# Patient Record
Sex: FEMALE | Race: WHITE | NOT HISPANIC OR LATINO | Employment: FULL TIME | URBAN - METROPOLITAN AREA
[De-identification: names, ages, dates, MRNs, and addresses within clinical notes are randomized per-mention and may not be internally consistent; named-entity substitution may affect disease eponyms.]

---

## 2017-10-04 ENCOUNTER — TELEPHONE (OUTPATIENT)
Dept: ONCOLOGY | Facility: HOSPITAL | Age: 62
End: 2017-10-04

## 2017-10-04 ENCOUNTER — DOCUMENTATION (OUTPATIENT)
Dept: ONCOLOGY | Facility: CLINIC | Age: 62
End: 2017-10-04

## 2017-10-04 NOTE — TELEPHONE ENCOUNTER
Patient calling to see if she needs f/u appt. According to last MD appt patient does need f/u appt with us and needs new referral. Patient states her PCP had already sent one over. Message sent to scheduling to set up appt for patient and note sent to Dr Alonso to see if patient needs scan beforehand.

## 2017-10-04 NOTE — PROGRESS NOTES
Per message from JODI Arriaza RN patient called stating she needs to follow up with Dr Alonso. Patient last seen at [facility] office 5/19/15. Patient stated PCP has sent a referral. Ghada @ appointment desk requesting a order be placed for Dexa scan. There is no patient insurance information in EPIC Dexa scan cannot be ordered until patient is seen by Dr Alonso. Spoke with Giovanna Nurse Manager and she verified patient will need to follow up with Dr Alonso before Dexa scan can be ordered.

## 2017-10-23 ENCOUNTER — APPOINTMENT (OUTPATIENT)
Dept: WOMENS IMAGING | Facility: HOSPITAL | Age: 62
End: 2017-10-23

## 2017-10-23 PROCEDURE — 76642 ULTRASOUND BREAST LIMITED: CPT | Performed by: RADIOLOGY

## 2017-10-23 PROCEDURE — G0202 SCR MAMMO BI INCL CAD: HCPCS | Performed by: RADIOLOGY

## 2017-10-23 PROCEDURE — MDREVIEWSP: Performed by: RADIOLOGY

## 2017-10-23 PROCEDURE — 77063 BREAST TOMOSYNTHESIS BI: CPT | Performed by: RADIOLOGY

## 2017-10-23 PROCEDURE — 77067 SCR MAMMO BI INCL CAD: CPT | Performed by: RADIOLOGY

## 2017-11-14 DIAGNOSIS — C50.919 MALIGNANT NEOPLASM OF BREAST IN FEMALE, ESTROGEN RECEPTOR POSITIVE, UNSPECIFIED LATERALITY, UNSPECIFIED SITE OF BREAST (HCC): Primary | ICD-10-CM

## 2017-11-14 DIAGNOSIS — Z17.0 MALIGNANT NEOPLASM OF BREAST IN FEMALE, ESTROGEN RECEPTOR POSITIVE, UNSPECIFIED LATERALITY, UNSPECIFIED SITE OF BREAST (HCC): Primary | ICD-10-CM

## 2017-11-16 ENCOUNTER — LAB (OUTPATIENT)
Dept: LAB | Facility: HOSPITAL | Age: 62
End: 2017-11-16

## 2017-11-16 ENCOUNTER — OFFICE VISIT (OUTPATIENT)
Dept: ONCOLOGY | Facility: CLINIC | Age: 62
End: 2017-11-16

## 2017-11-16 ENCOUNTER — INFUSION (OUTPATIENT)
Dept: ONCOLOGY | Facility: HOSPITAL | Age: 62
End: 2017-11-16

## 2017-11-16 VITALS
TEMPERATURE: 98.5 F | SYSTOLIC BLOOD PRESSURE: 118 MMHG | OXYGEN SATURATION: 96 % | RESPIRATION RATE: 16 BRPM | BODY MASS INDEX: 39.92 KG/M2 | WEIGHT: 239.6 LBS | DIASTOLIC BLOOD PRESSURE: 84 MMHG | HEART RATE: 78 BPM | HEIGHT: 65 IN

## 2017-11-16 DIAGNOSIS — Z17.0 MALIGNANT NEOPLASM OF BREAST IN FEMALE, ESTROGEN RECEPTOR POSITIVE, UNSPECIFIED LATERALITY, UNSPECIFIED SITE OF BREAST (HCC): ICD-10-CM

## 2017-11-16 DIAGNOSIS — Z17.0 MALIGNANT NEOPLASM OF LEFT BREAST IN FEMALE, ESTROGEN RECEPTOR POSITIVE, UNSPECIFIED SITE OF BREAST (HCC): Primary | ICD-10-CM

## 2017-11-16 DIAGNOSIS — C50.919 MALIGNANT NEOPLASM OF BREAST IN FEMALE, ESTROGEN RECEPTOR POSITIVE, UNSPECIFIED LATERALITY, UNSPECIFIED SITE OF BREAST (HCC): ICD-10-CM

## 2017-11-16 DIAGNOSIS — C50.912 MALIGNANT NEOPLASM OF LEFT BREAST IN FEMALE, ESTROGEN RECEPTOR POSITIVE, UNSPECIFIED SITE OF BREAST (HCC): Primary | ICD-10-CM

## 2017-11-16 LAB
BASOPHILS # BLD AUTO: 0.09 10*3/MM3 (ref 0–0.1)
BASOPHILS NFR BLD AUTO: 1.2 % (ref 0–1.1)
DEPRECATED RDW RBC AUTO: 41 FL (ref 37–49)
EOSINOPHIL # BLD AUTO: 0.6 10*3/MM3 (ref 0–0.36)
EOSINOPHIL NFR BLD AUTO: 8.1 % (ref 1–5)
ERYTHROCYTE [DISTWIDTH] IN BLOOD BY AUTOMATED COUNT: 13.2 % (ref 11.7–14.5)
HCT VFR BLD AUTO: 43.4 % (ref 34–45)
HGB BLD-MCNC: 14.2 G/DL (ref 11.5–14.9)
IMM GRANULOCYTES # BLD: 0.02 10*3/MM3 (ref 0–0.03)
IMM GRANULOCYTES NFR BLD: 0.3 % (ref 0–0.5)
LYMPHOCYTES # BLD AUTO: 2.37 10*3/MM3 (ref 1–3.5)
LYMPHOCYTES NFR BLD AUTO: 31.9 % (ref 20–49)
MCH RBC QN AUTO: 28 PG (ref 27–33)
MCHC RBC AUTO-ENTMCNC: 32.7 G/DL (ref 32–35)
MCV RBC AUTO: 85.4 FL (ref 83–97)
MONOCYTES # BLD AUTO: 0.55 10*3/MM3 (ref 0.25–0.8)
MONOCYTES NFR BLD AUTO: 7.4 % (ref 4–12)
NEUTROPHILS # BLD AUTO: 3.8 10*3/MM3 (ref 1.5–7)
NEUTROPHILS NFR BLD AUTO: 51.1 % (ref 39–75)
NRBC BLD MANUAL-RTO: 0 /100 WBC (ref 0–0)
PLATELET # BLD AUTO: 270 10*3/MM3 (ref 150–375)
PMV BLD AUTO: 10.3 FL (ref 8.9–12.1)
RBC # BLD AUTO: 5.08 10*6/MM3 (ref 3.9–5)
WBC NRBC COR # BLD: 7.43 10*3/MM3 (ref 4–10)

## 2017-11-16 PROCEDURE — 36415 COLL VENOUS BLD VENIPUNCTURE: CPT | Performed by: INTERNAL MEDICINE

## 2017-11-16 PROCEDURE — 99214 OFFICE O/P EST MOD 30 MIN: CPT | Performed by: INTERNAL MEDICINE

## 2017-11-16 PROCEDURE — 85025 COMPLETE CBC W/AUTO DIFF WBC: CPT | Performed by: INTERNAL MEDICINE

## 2017-11-16 RX ORDER — PRAVASTATIN SODIUM 40 MG
40 TABLET ORAL NIGHTLY
COMMUNITY
Start: 2017-09-13

## 2017-11-16 RX ORDER — ASPIRIN 81 MG/1
81 TABLET ORAL 2 TIMES DAILY
COMMUNITY

## 2017-11-16 RX ORDER — LOSARTAN POTASSIUM 50 MG/1
TABLET ORAL
COMMUNITY
Start: 2017-04-09

## 2017-11-16 RX ORDER — ASCORBIC ACID 500 MG
500 TABLET ORAL
COMMUNITY

## 2017-11-16 RX ORDER — VITAMIN B COMPLEX
1 CAPSULE ORAL DAILY
COMMUNITY

## 2017-11-16 RX ORDER — VENLAFAXINE HYDROCHLORIDE 75 MG/1
CAPSULE, EXTENDED RELEASE ORAL
COMMUNITY
Start: 2017-10-22

## 2017-11-16 NOTE — PROGRESS NOTES
Subjective   REASON FOR FOLLOWUP:       1.    T1cN0 low OncoType 16 , ER/MD breast cancer on Arimidex  2010. -    2. Worsening osteopenia, Boniva added in 2012.     3. Left chest wall costochondral pain, ? etiology, in 2012.     4. New lung nodule on CT at [facility] .     5. Lung nodule appeared to be resolved in 2013, but is stable in May 2014.         Worsening osteopenia in 2014 despite oral bisphosphonate Prolia added  in 5/15        History of Present Illness  patient is a 62-year-old female with a node-negative breast cancer low Oncotype score and completed 5 years of therapy with Arimidex about 2 years ago.  She was inadvertently lost to follow-up and returns for follow-up today with no new complaints.  She was supposed to continue on Prolia for her bone density but did not do this and at this time first to repeat a bone density to see how she is doing.  We discussed data about extended adjuvant treatment and at this point I think it may be reasonable to do a breast cancer index to see her chance if she has a high risk breast cancer in which case we would resume therapy with tamoxifen because of bone density issues and she is agreeable.  He is up-to-date with colonoscopies and is working to keep her weight down and having a hard time as  most postmenopausal women do    Active Ambulatory Problems     Diagnosis Date Noted   • Breast cancer 2009     Resolved Ambulatory Problems     Diagnosis Date Noted   • No Resolved Ambulatory Problems     Past Medical History:   Diagnosis Date   • Breast cancer    • Depression    • Diabetes mellitus    • Fatty liver    • Hypertension    • Osteopenia      Past Surgical History:   Procedure Laterality Date   • BREAST SURGERY  , ,     Breast tumor removed   •  SECTION     • HYSTERECTOMY  2010   • MASTECTOMY     • NOSE SURGERY         ONCOLOGICAL HISTORY:   The patient was found, on routine  mammography, to have an abnormality of the left breast, which is suspicious for DCIS.  Biopsy confirmed DCIS.  MRI showed a large amount of residual abnormality in the same breast and the patient op  t  ed for a mastectomy.  At the time of the mastectomy, surprisingly 75% of the 2.3 cm mass of DCIS was found to be invasive cancer (not microinvasion) and this is confirmed by special stains.  ER/NH was positive, HER-2 negative.  Oncotype DX was sent, but t  he pathologist at [facility] did not recognize the invasive cancer and said there was not enough invasive cancer to perform the specimen.  Dr. Ang Martinez has called them and redirected them to repeat the Oncotype DX.         Repeat Oncotype DX score showed recurrence score of 16 which is low with a 10% risk of recurrence overall and the patient was offered hormonal therapy with Arimidex after discussing the side effect profile.        The patient had the sudden onset of left chest wall discomfort in September 2012.    CAT scan of the chest was done which showed shotty nodes adjacent to the left subpectoral implant along the superior margin, and a small left axillary node.  No other pathological abnormalities and the lung was otherwise unremarkable.  The sternum was nor  mal and there were no rib fractures or lesions noted.  Fatty liver was also noted.          Patient seen at [facility] in 2/13 with persistent cough.  Chest x-ray showed an abnormality which led to a CT that reportedly showed a pulmonary nodule.  We will try and ge  t these films but followup scan in three months has been ordered to make sure the nodule resolves.  We discussed switching to tamoxifen because of hair loss issues but because of the Wellbutrin and Cymbalta that she has been on chronically, we opted to no  t do that at this time.         CT dated 05/28/13 shows stable small right mid field opacity that has been present since 09/12/12.  No change in shotty mediastinal axillary lymph  "nodes.  Followup in another year planned.            OB/GYN HISTORY: She is  1, para 1.  Her first childbirth was at age 38.  She took birth control pills for many years prior to her delivery, which was a . She did not breast feed.  Menarche was at age 12 and menopause at age 47.  She has ha  d no hormonal therapy since menopause.                 MEDICATIONS: The current medication list was reviewed with the patient and updates in the EMR this date per the [facility] assistant.  Medication dosages and frequencies were confirmed to be accurate        ALLERGIES:  NAPROXEN.         SOCIAL HISTORY:  .  Does not smoke or drink.  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         FAMILY HISTORY:   Mother had breast cancer   in her late 50s, had a lumpectomy and radiation and had recurrence in the same breast requiring a mastectomy, but  of unrelated causes.  She has no other history of breast or ovarian cancer in the family. Her paternal grandmother had colon cancer in   her 70s.          Review of Systems   A comprehensive 14 point review of systems was performed and was negative except as mentioned.    Medications:  The current medication list was reviewed in the EMR    ALLERGIES:    Allergies   Allergen Reactions   • Naproxen        Objective      Vitals:    17 1027   BP: 118/84   Pulse: 78   Resp: 16   Temp: 98.5 °F (36.9 °C)   TempSrc: Oral   SpO2: 96%   Weight: 239 lb 9.6 oz (109 kg)   Height: 64.72\" (164.4 cm)  Comment: new ht w/shoes   PainSc: 0-No pain     Current Status 2017   ECOG score 0       Physical Exam    GENERAL:  Well-developed, well-nourished in no acute distress.   SKIN:  Warm, dry without rashes, purpura or petechiae.  EYES:  Pupils equal, round and reactive to light.  EOMs intact.  Conjunctivae normal.  EARS:  Hearing intact.  NOSE:  Septum midline.  No excoriations or nasal discharge.  MOUTH:  Tongue is well-papillated; no stomatitis or ulcers.  Lips " normal.  THROAT:  Oropharynx without lesions or exudates.  NECK:  Supple with good range of motion; no thyromegaly or masses, no JVD.  LYMPHATICS:  No cervical, supraclavicular, axillary or inguinal adenopathy.  CHEST:  Lungs clear to auscultation. Good airflow.  BREASTS: Left breast has an implant in place) shows fibrocystic nodularity but no dominant masses  CARDIAC:  Regular rate and rhythm without murmurs, rubs or gallops. Normal S1,S2.  ABDOMEN:  Soft, nontender with no hepatosplenomegaly or masses.  EXTREMITIES:  No clubbing, cyanosis or edema.  NEUROLOGICAL:  Cranial Nerves II-XII grossly intact.  No focal neurological deficits.  PSYCHIATRIC:  Normal affect and mood.        RECENT LABS:  Hematology WBC   Date Value Ref Range Status   11/16/2017 7.43 4.00 - 10.00 10*3/mm3 Final     RBC   Date Value Ref Range Status   11/16/2017 5.08 (H) 3.90 - 5.00 10*6/mm3 Final     Hemoglobin   Date Value Ref Range Status   11/16/2017 14.2 11.5 - 14.9 g/dL Final     Hematocrit   Date Value Ref Range Status   11/16/2017 43.4 34.0 - 45.0 % Final     Platelets   Date Value Ref Range Status   11/16/2017 270 150 - 375 10*3/mm3 Final              Assessment/Plan   1. Stage I, T1cN0 breast cancer, 1.6 cm, low OncoType score. At this point she has stopped her Arimidex   2.  worsening bone density they have opted to give he one dose of Prolia a given in 10/15 -she is due for bone density    Plan  1.  Bone density testing decided about further treatment  2.  Breast cancer index to see if there is any benefit to continued therapy  3.  Return in 1 year for follow-up unless her BCI  is high risk in which case we will start her on tamoxifen                    11/16/2017      CC:

## 2017-11-17 ENCOUNTER — LAB REQUISITION (OUTPATIENT)
Dept: LAB | Facility: HOSPITAL | Age: 62
End: 2017-11-17

## 2017-11-17 DIAGNOSIS — C50.919 MALIGNANT NEOPLASM OF FEMALE BREAST (HCC): ICD-10-CM

## 2017-11-27 ENCOUNTER — TELEPHONE (OUTPATIENT)
Dept: ONCOLOGY | Facility: CLINIC | Age: 62
End: 2017-11-27

## 2017-12-04 ENCOUNTER — HOSPITAL ENCOUNTER (OUTPATIENT)
Dept: BONE DENSITY | Facility: HOSPITAL | Age: 62
Discharge: HOME | End: 2017-12-04
Attending: INTERNAL MEDICINE | Admitting: INTERNAL MEDICINE

## 2017-12-04 DIAGNOSIS — Z17.0 MALIGNANT NEOPLASM OF LEFT BREAST IN FEMALE, ESTROGEN RECEPTOR POSITIVE, UNSPECIFIED SITE OF BREAST (HCC): ICD-10-CM

## 2017-12-04 DIAGNOSIS — C50.912 MALIGNANT NEOPLASM OF LEFT BREAST IN FEMALE, ESTROGEN RECEPTOR POSITIVE, UNSPECIFIED SITE OF BREAST (HCC): ICD-10-CM

## 2017-12-04 PROCEDURE — 77080 DXA BONE DENSITY AXIAL: CPT

## 2018-01-13 LAB
LAB AP CASE REPORT: NORMAL
LAB AP DIAGNOSIS COMMENT: NORMAL
Lab: NORMAL
Lab: NORMAL
PATH REPORT.ADDENDUM SPEC: NORMAL

## 2018-01-15 ENCOUNTER — DOCUMENTATION (OUTPATIENT)
Dept: ONCOLOGY | Facility: CLINIC | Age: 63
End: 2018-01-15

## 2018-01-15 NOTE — PROGRESS NOTES
Called patient regarding her rest cancer index which came back as low risk and low chance of benefit of extended hormonal therapy and I told her she could stay off her Arimidex.  A bone density also shows mild osteopenia and no treatment is needed there is a 4% improvement from the last density

## 2018-10-31 ENCOUNTER — APPOINTMENT (OUTPATIENT)
Dept: WOMENS IMAGING | Facility: HOSPITAL | Age: 63
End: 2018-10-31

## 2018-10-31 PROCEDURE — 77067 SCR MAMMO BI INCL CAD: CPT | Performed by: RADIOLOGY

## 2018-10-31 PROCEDURE — MDREVIEWSP: Performed by: RADIOLOGY

## 2018-10-31 PROCEDURE — 77063 BREAST TOMOSYNTHESIS BI: CPT | Performed by: RADIOLOGY

## 2018-11-30 ENCOUNTER — OFFICE VISIT (OUTPATIENT)
Dept: ONCOLOGY | Facility: CLINIC | Age: 63
End: 2018-11-30

## 2018-11-30 ENCOUNTER — LAB (OUTPATIENT)
Dept: LAB | Facility: HOSPITAL | Age: 63
End: 2018-11-30

## 2018-11-30 VITALS
SYSTOLIC BLOOD PRESSURE: 121 MMHG | BODY MASS INDEX: 38.87 KG/M2 | OXYGEN SATURATION: 98 % | RESPIRATION RATE: 16 BRPM | DIASTOLIC BLOOD PRESSURE: 75 MMHG | TEMPERATURE: 98.7 F | HEART RATE: 78 BPM | WEIGHT: 233.3 LBS | HEIGHT: 65 IN

## 2018-11-30 DIAGNOSIS — Z17.0 MALIGNANT NEOPLASM INVOLVING BOTH NIPPLE AND AREOLA OF RIGHT BREAST IN FEMALE, ESTROGEN RECEPTOR POSITIVE (HCC): Primary | ICD-10-CM

## 2018-11-30 DIAGNOSIS — C50.011 MALIGNANT NEOPLASM INVOLVING BOTH NIPPLE AND AREOLA OF RIGHT BREAST IN FEMALE, ESTROGEN RECEPTOR POSITIVE (HCC): Primary | ICD-10-CM

## 2018-11-30 DIAGNOSIS — C50.011 MALIGNANT NEOPLASM INVOLVING BOTH NIPPLE AND AREOLA OF RIGHT BREAST IN FEMALE, UNSPECIFIED ESTROGEN RECEPTOR STATUS (HCC): Primary | ICD-10-CM

## 2018-11-30 LAB
ALBUMIN SERPL-MCNC: 4.5 G/DL (ref 3.5–5.2)
ALBUMIN/GLOB SERPL: 1.6 G/DL (ref 1.1–2.4)
ALP SERPL-CCNC: 71 U/L (ref 38–116)
ALT SERPL W P-5'-P-CCNC: 54 U/L (ref 0–33)
ANION GAP SERPL CALCULATED.3IONS-SCNC: 15.7 MMOL/L
AST SERPL-CCNC: 48 U/L (ref 0–32)
BASOPHILS # BLD AUTO: 0.07 10*3/MM3 (ref 0–0.1)
BASOPHILS NFR BLD AUTO: 1.2 % (ref 0–1.1)
BILIRUB SERPL-MCNC: 0.3 MG/DL (ref 0.1–1.2)
BUN BLD-MCNC: 17 MG/DL (ref 6–20)
BUN/CREAT SERPL: 21.8 (ref 7.3–30)
CALCIUM SPEC-SCNC: 9.3 MG/DL (ref 8.5–10.2)
CHLORIDE SERPL-SCNC: 102 MMOL/L (ref 98–107)
CO2 SERPL-SCNC: 21.3 MMOL/L (ref 22–29)
CREAT BLD-MCNC: 0.78 MG/DL (ref 0.6–1.1)
DEPRECATED RDW RBC AUTO: 41.7 FL (ref 37–49)
EOSINOPHIL # BLD AUTO: 0.34 10*3/MM3 (ref 0–0.36)
EOSINOPHIL NFR BLD AUTO: 5.7 % (ref 1–5)
ERYTHROCYTE [DISTWIDTH] IN BLOOD BY AUTOMATED COUNT: 13.4 % (ref 11.7–14.5)
GFR SERPL CREATININE-BSD FRML MDRD: 75 ML/MIN/1.73
GLOBULIN UR ELPH-MCNC: 2.9 GM/DL (ref 1.8–3.5)
GLUCOSE BLD-MCNC: 176 MG/DL (ref 74–124)
HCT VFR BLD AUTO: 42.6 % (ref 34–45)
HGB BLD-MCNC: 13.6 G/DL (ref 11.5–14.9)
IMM GRANULOCYTES # BLD: 0.02 10*3/MM3 (ref 0–0.03)
IMM GRANULOCYTES NFR BLD: 0.3 % (ref 0–0.5)
LYMPHOCYTES # BLD AUTO: 2.06 10*3/MM3 (ref 1–3.5)
LYMPHOCYTES NFR BLD AUTO: 34.6 % (ref 20–49)
MCH RBC QN AUTO: 27.4 PG (ref 27–33)
MCHC RBC AUTO-ENTMCNC: 31.9 G/DL (ref 32–35)
MCV RBC AUTO: 85.9 FL (ref 83–97)
MONOCYTES # BLD AUTO: 0.51 10*3/MM3 (ref 0.25–0.8)
MONOCYTES NFR BLD AUTO: 8.6 % (ref 4–12)
NEUTROPHILS # BLD AUTO: 2.95 10*3/MM3 (ref 1.5–7)
NEUTROPHILS NFR BLD AUTO: 49.6 % (ref 39–75)
NRBC BLD MANUAL-RTO: 0 /100 WBC (ref 0–0)
PLATELET # BLD AUTO: 282 10*3/MM3 (ref 150–375)
PMV BLD AUTO: 10.1 FL (ref 8.9–12.1)
POTASSIUM BLD-SCNC: 4.3 MMOL/L (ref 3.5–4.7)
PROT SERPL-MCNC: 7.4 G/DL (ref 6.3–8)
RBC # BLD AUTO: 4.96 10*6/MM3 (ref 3.9–5)
SODIUM BLD-SCNC: 139 MMOL/L (ref 134–145)
WBC NRBC COR # BLD: 5.95 10*3/MM3 (ref 4–10)

## 2018-11-30 PROCEDURE — 99214 OFFICE O/P EST MOD 30 MIN: CPT | Performed by: INTERNAL MEDICINE

## 2018-11-30 PROCEDURE — 80053 COMPREHEN METABOLIC PANEL: CPT | Performed by: INTERNAL MEDICINE

## 2018-11-30 PROCEDURE — 36415 COLL VENOUS BLD VENIPUNCTURE: CPT | Performed by: INTERNAL MEDICINE

## 2018-11-30 PROCEDURE — 85025 COMPLETE CBC W/AUTO DIFF WBC: CPT | Performed by: INTERNAL MEDICINE

## 2018-11-30 RX ORDER — CHOLECALCIFEROL (VITAMIN D3) 25 MCG
1 TABLET,CHEWABLE ORAL DAILY
COMMUNITY

## 2018-11-30 RX ORDER — CALCIUM CARBONATE/VITAMIN D3 500-10/5ML
1 LIQUID (ML) ORAL DAILY
COMMUNITY

## 2018-11-30 RX ORDER — HYDROCODONE BITARTRATE AND ACETAMINOPHEN 5; 325 MG/1; MG/1
1 TABLET ORAL EVERY 6 HOURS PRN
COMMUNITY
End: 2020-12-14

## 2018-11-30 NOTE — PROGRESS NOTES
Subjective   REASON FOR FOLLOWUP:       1.    T1cN0 low OncoType 16 , ER/AK breast cancer on Arimidex  2010. -    2. Worsening osteopenia, Boniva added in 2012.     3. Left chest wall costochondral pain, ? etiology, in 2012.     4. New lung nodule on [facility] at [facility] .     5. Lung nodule appeared to be resolved in 2013, but is stable in May 2014.         Worsening osteopenia in 2014 despite oral bisphosphonate Prolia added  in 5/15        History of Present Illness  patient is a 62-year-old female with a node-negative breast cancer low Oncotype score and completed 5 years of therapy with Arimidex about 2 years ago.  She was inadvertently lost to follow-up and we opted to do a breast cancer index which thankfully shows low risk of delayed recurrence and no further treatment was indicated     sHe is up-to-date with colonoscopies and is working to keep her weight down and having a hard time as  most postmenopausal women do    On density is actually fairly stable and no further treatment is needed    She tells me she's had a tongue lesion biopsied and the path reports are pending.  She was having a lot of pain I have prescribed some viscous Xylocaine to help with this    Active Ambulatory Problems     Diagnosis Date Noted   • Breast cancer (CMS/HCC) 2009     Resolved Ambulatory Problems     Diagnosis Date Noted   • No Resolved Ambulatory Problems     Past Medical History:   Diagnosis Date   • Breast cancer (CMS/HCC)    • Depression    • Diabetes mellitus (CMS/HCC)    • Fatty liver    • H/O Lung nodule    • Hyperlipidemia    • Hypertension    • Osteopenia    • Peptic ulceration      Past Surgical History:   Procedure Laterality Date   • BREAST BIOPSY Left    • BREAST SURGERY  , ,     Breast tumor removed   •  SECTION     • HYSTERECTOMY  2010    and oophorectomy   • MASTECTOMY Left    • NOSE SURGERY      Cyst in sinus   • TUMOR  REMOVAL      Leg, benign       ONCOLOGICAL HISTORY:   The patient was found, on routine mammography, to have an abnormality of the left breast, which is suspicious for DCIS.  Biopsy confirmed DCIS.  MRI showed a large amount of residual abnormality in the same breast and the patient op  t  ed for a mastectomy.  At the time of the mastectomy, surprisingly 75% of the 2.3 cm mass of DCIS was found to be invasive cancer (not microinvasion) and this is confirmed by special stains.  ER/NC was positive, HER-2 negative.  Onc[facility] was sent, but t  he pathologist at [facility] did not recognize the invasive cancer and said there was not enough invasive cancer to perform the specimen.  Dr. Ang Martinez has called them and redirected them to repeat the Oncotype DX.         Repeat Oncotype DX score showed recurrence score of 16 which is low with a 10% risk of recurrence overall and the patient was offered hormonal therapy with Arimidex after discussing the side effect profile.        The patient had the sudden onset of left chest wall discomfort in September 2012.    CAT scan of the chest was done which showed shotty nodes adjacent to the left subpectoral implant along the superior margin, and a small left axillary node.  No other pathological abnormalities and the lung was otherwise unremarkable.  The sternum was nor  mal and there were no rib fractures or lesions noted.  Fatty liver was also noted.          Patient seen at [facility] in 2/13 with persistent cough.  Chest x-ray showed an abnormality which led to a CT that reportedly showed a pulmonary nodule.  We will try and ge  t these films but followup scan in three months has been ordered to make sure the nodule resolves.  We discussed switching to tamoxifen because of hair loss issues but because of the Wellbutrin and Cymbalta that she has been on chronically, we opted to no  t do that at this time.         CT dated 05/28/13 shows stable small right mid field opacity  that has been present since 12.  No change in shotty mediastinal axillary lymph nodes.  Followup in another year planned.            OB/GYN HISTORY: She is  1, para 1.  Her first childbirth was at age 38.  She took birth control pills for many years prior to her delivery, which was a . She did not breast feed.  Menarche was at age 12 and menopause at age 47.  She has ha  d no hormonal therapy since menopause.                 MEDICATIONS: The current medication list was reviewed with the patient and updates in the EMR this date per the [facility] assistant.  Medication dosages and frequencies were confirmed to be accurate        ALLERGIES:  NAPROXEN.         SOCIAL HISTORY:  .  Does not smoke or drink.  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         FAMILY HISTORY:   Mother had breast cancer   in her late 50s, had a lumpectomy and radiation and had recurrence in the same breast requiring a mastectomy, but  of unrelated causes.  She has no other history of breast or ovarian cancer in the family. Her paternal grandmother had colon cancer in   her 70s.          Review of Systems   Constitutional: Negative for chills, fatigue and fever.   HENT: Positive for mouth sores (biopsy on tongue 18). Negative for congestion.    Respiratory: Negative.  Negative for chest tightness and shortness of breath.    Cardiovascular: Negative.  Negative for chest pain.   Gastrointestinal: Positive for diarrhea (loose stool all the time 18). Negative for abdominal pain, constipation, nausea and vomiting.   Genitourinary: Negative.  Negative for difficulty urinating and hematuria.   Musculoskeletal: Negative.    Neurological: Negative.  Negative for dizziness and headaches.   Psychiatric/Behavioral: The patient is not nervous/anxious.       A comprehensive 14 point review of systems was performed and was negative except as mentioned.    Medications:  The current medication list was  reviewed in the EMR    ALLERGIES:    Allergies   Allergen Reactions   • Naproxen        Objective      Vitals:    11/30/18 1032   BP: 121/75   Pulse: 78   Resp: 16   Temp: 98.7 °F (37.1 °C)   SpO2: 98%   Weight: 106 kg (233 lb 4.8 oz)   PainSc: 0-No pain     Current Status 11/30/2018   ECOG score 0       Physical Exam    GENERAL:  Well-developed, well-nourished in no acute distress.   SKIN:  Warm, dry without rashes, purpura or petechiae.  EYES:  Pupils equal, round and reactive to light.  EOMs intact.  Conjunctivae normal.  EARS:  Hearing intact.  NOSE:  Septum midline.  No excoriations or nasal discharge.  MOUTH:  Tongue is well-papillated shallow ulcer from her biopsy on the tip of the tongue; no stomatitis or ulcers.  Lips normal.  THROAT:  Oropharynx without lesions or exudates.  NECK:  Supple with good range of motion; no thyromegaly or masses, no JVD.  LYMPHATICS:  No cervical, supraclavicular, axillary or inguinal adenopathy.  CHEST:  Lungs clear to auscultation. Good airflow.  BREASTS: Left breast has an implant in place) right breast shows fibrocystic nodularity but no dominant masses  CARDIAC:  Regular rate and rhythm without murmurs, rubs or gallops. Normal S1,S2.  ABDOMEN:  Soft, nontender with no hepatosplenomegaly or masses.  EXTREMITIES:  No clubbing, cyanosis or edema.  NEUROLOGICAL:  Cranial Nerves II-XII grossly intact.  No focal neurological deficits.  PSYCHIATRIC:  Normal affect and mood.        RECENT LABS:  Hematology WBC   Date Value Ref Range Status   11/30/2018 5.95 4.00 - 10.00 10*3/mm3 Final     RBC   Date Value Ref Range Status   11/30/2018 4.96 3.90 - 5.00 10*6/mm3 Final     Hemoglobin   Date Value Ref Range Status   11/30/2018 13.6 11.5 - 14.9 g/dL Final     Hematocrit   Date Value Ref Range Status   11/30/2018 42.6 34.0 - 45.0 % Final     Platelets   Date Value Ref Range Status   11/30/2018 282 150 - 375 10*3/mm3 Final          FINDINGS: Average lumbar T score is -1.5.     Left  proximal femoral T score is -0.1. Femoral neck T score is -1.0.     Right proximal femoral T score is 0. The femoral neck T score is -0.4.     IMPRESSION:  Osteopenia with a 4.4% improvement in bone density since  previous study.     This report was finalized on 12/4/2017     Assessment/Plan   1. Stage I, T1cN0 breast cancer, 1.6 cm, low OncoType score. At this point she has stopped her Arimidex after 5 years of treatment  · Breast cancer index low risk for recurrence    2.  Stable to improved bone density  3.  Lesion on the tip of the tongue biopsy by oral surgery Pending  Plan  1.  No further follow-up needed at this time unless her tongue lesion shows a malignancy                  11/30/2018      CC:

## 2019-12-10 ENCOUNTER — APPOINTMENT (OUTPATIENT)
Dept: WOMENS IMAGING | Facility: HOSPITAL | Age: 64
End: 2019-12-10

## 2019-12-10 PROCEDURE — MDREVIEWSP: Performed by: RADIOLOGY

## 2019-12-10 PROCEDURE — 77061 BREAST TOMOSYNTHESIS UNI: CPT | Performed by: RADIOLOGY

## 2019-12-10 PROCEDURE — 77065 DX MAMMO INCL CAD UNI: CPT | Performed by: RADIOLOGY

## 2019-12-10 PROCEDURE — G0279 TOMOSYNTHESIS, MAMMO: HCPCS | Performed by: RADIOLOGY

## 2020-12-14 ENCOUNTER — APPOINTMENT (OUTPATIENT)
Dept: WOMENS IMAGING | Facility: HOSPITAL | Age: 65
End: 2020-12-14

## 2020-12-14 ENCOUNTER — OFFICE VISIT (OUTPATIENT)
Dept: CARDIOLOGY | Facility: CLINIC | Age: 65
End: 2020-12-14

## 2020-12-14 VITALS
HEART RATE: 79 BPM | DIASTOLIC BLOOD PRESSURE: 80 MMHG | BODY MASS INDEX: 37.15 KG/M2 | WEIGHT: 223 LBS | HEIGHT: 65 IN | SYSTOLIC BLOOD PRESSURE: 138 MMHG

## 2020-12-14 DIAGNOSIS — R00.2 PALPITATIONS: Primary | ICD-10-CM

## 2020-12-14 PROCEDURE — 99203 OFFICE O/P NEW LOW 30 MIN: CPT | Performed by: INTERNAL MEDICINE

## 2020-12-14 PROCEDURE — 93000 ELECTROCARDIOGRAM COMPLETE: CPT | Performed by: INTERNAL MEDICINE

## 2020-12-14 PROCEDURE — 77067 SCR MAMMO BI INCL CAD: CPT | Performed by: RADIOLOGY

## 2020-12-14 PROCEDURE — 77063 BREAST TOMOSYNTHESIS BI: CPT | Performed by: RADIOLOGY

## 2020-12-14 RX ORDER — DULOXETIN HYDROCHLORIDE 30 MG/1
30 CAPSULE, DELAYED RELEASE ORAL DAILY
COMMUNITY
Start: 2020-05-27 | End: 2021-05-27

## 2020-12-14 NOTE — PROGRESS NOTES
Subjective:     Encounter Date:12/14/20      Patient ID: Nancy Rizvi is a 65 y.o. female.    Chief Complaint:  History of Present Illness    Dear MANUEL Shaffer    I had the pleasure of seeing this patient in the office today for initial evaluation and consultation.  I appreciate that you sent her in to see us.  They come in today to be seen for increased heart rate with activity.    Patient had an appointment today because during the summer when she is working hard in the garden she will notice that her heart rate goes up.  When she was digging out some 10 foot trees, it was incredibly strenuous, and when she lifted her heart rate monitor on her watch it told her heart rate was 145.  She could feel her heart was beating harder and faster but otherwise did not have any symptoms.  No chest pain or chest discomfort and no shortness of breath.  She has noted, now that she pays attention to it that if she is doing something really strenuous her heart rate will go up but it comes back down as soon as she stops.  She is never had any sensation or documentation of increased heart rate when she is not doing something strenuous.  She only saw this during the summer when she is working in the yard.  She has not had any episodes like this since then.    In 2013 she was diagnosed with a minuscule CVA has been seen by neurologist intermittently since.  She just was seen by them again and is scheduled for carotid duplex, and echocardiogram, and a MRI on the 21st.    She had a stress test in 2016 that was normal.  Prior CT scans of her chest have not been documented to show any coronary artery calcification.    The following portions of the patient's history were reviewed and updated as appropriate: allergies, current medications, past family history, past medical history, past social history, past surgical history and problem list.    Past Medical History:   Diagnosis Date   • Breast cancer (CMS/HCC) 2009    Left,  Stage I, T1N0   • Depression    • Diabetes mellitus (CMS/HCC)    • Fatty liver    • H/O Lung nodule     Stable in 2014   • Hyperlipidemia    • Hypertension    • Osteopenia    • Peptic ulceration    • Sleep apnea     cpap       Past Surgical History:   Procedure Laterality Date   • BREAST BIOPSY Left    • BREAST SURGERY  , ,     Breast tumor removed   •  SECTION     • HYSTERECTOMY  2010    and oophorectomy   • MASTECTOMY Left    • NOSE SURGERY      Cyst in sinus   • TONGUE BIOPSY / EXCISION  2018   • TUMOR REMOVAL      Leg, benign       Social History     Socioeconomic History   • Marital status:      Spouse name: Avery    • Number of children: 1   • Years of education: High school   • Highest education level: Not on file   Occupational History   • Occupation: [employer]     Employer: SELF-EMPLOYED     Comment: [employer]   Tobacco Use   • Smoking status: Former Smoker     Packs/day: 1.00     Years: 20.00     Pack years: 20.00     Types: Cigarettes   • Smokeless tobacco: Never Used   Substance and Sexual Activity   • Alcohol use: No   • Drug use: No   • Sexual activity: Defer       Review of Systems   Constitution: Negative for chills, decreased appetite, fever and night sweats.   HENT: Negative for ear discharge, ear pain, hearing loss, nosebleeds and sore throat.    Eyes: Negative for blurred vision, double vision and pain.   Cardiovascular: Negative for cyanosis.   Respiratory: Negative for hemoptysis and sputum production.    Endocrine: Negative for cold intolerance and heat intolerance.   Hematologic/Lymphatic: Negative for adenopathy.   Skin: Negative for dry skin, itching, nail changes, rash and suspicious lesions.   Musculoskeletal: Negative for arthritis, gout, muscle cramps, muscle weakness, myalgias and neck pain.   Gastrointestinal: Negative for anorexia, bowel incontinence, constipation, diarrhea, dysphagia, hematemesis and jaundice.  "  Genitourinary: Negative for bladder incontinence, dysuria, flank pain, frequency, hematuria and nocturia.   Neurological: Negative for focal weakness, numbness, paresthesias and seizures.   Psychiatric/Behavioral: Negative for altered mental status, hallucinations, hypervigilance, suicidal ideas and thoughts of violence.   Allergic/Immunologic: Negative for persistent infections.         ECG 12 Lead    Date/Time: 12/14/2020 9:06 AM  Performed by: Adam Driver III, MD  Authorized by: Adam Driver III, MD   Comparison: compared with previous ECG   Similar to previous ECG  Rhythm: sinus rhythm  Rate: normal  Conduction: conduction normal  ST Segments: ST segments normal  T Waves: T waves normal  QRS axis: normal  Other: no other findings    Clinical impression: normal ECG               Objective:     Vitals:    12/14/20 0858   BP: 138/80   Pulse: 79   Weight: 101 kg (223 lb)   Height: 165.1 cm (65\")         Vitals signs reviewed.   Constitutional:       General: Not in acute distress.     Appearance: Well-developed. Not diaphoretic.   Eyes:      General:         Right eye: No discharge.         Left eye: No discharge.      Conjunctiva/sclera: Conjunctivae normal.      Pupils: Pupils are equal, round, and reactive to light.   HENT:      Head: Normocephalic and atraumatic.      Nose: Nose normal.   Neck:      Musculoskeletal: Normal range of motion and neck supple.      Thyroid: No thyromegaly.      Trachea: No tracheal deviation.      Lymphadenopathy: No cervical adenopathy.   Pulmonary:      Effort: Pulmonary effort is normal. No respiratory distress.      Breath sounds: Normal breath sounds. No stridor.   Chest:      Chest wall: Not tender to palpatation.   Cardiovascular:      Normal rate. Regular rhythm.      Murmurs: There is no murmur.      . No S3 gallop. No click. No rub.   Pulses:     Intact distal pulses.   Edema:     Peripheral edema absent.   Abdominal:      General: Bowel sounds are normal. There is " no distension.      Palpations: Abdomen is soft. There is no abdominal mass.      Tenderness: There is no abdominal tenderness. There is no guarding or rebound.   Musculoskeletal: Normal range of motion.         General: No tenderness or deformity.   Skin:     General: Skin is warm and dry.      Findings: No erythema or rash.   Neurological:      Mental Status: Alert and oriented to person, place, and time.      Deep Tendon Reflexes: Reflexes are normal and symmetric.   Psychiatric:         Thought Content: Thought content normal.         Lab Review:             Performed        Assessment:          Diagnosis Plan   1. Palpitations  ECG 12 Lead          Plan:       1.  Palpitations and tachycardia-this appears to be appropriate and only occurs with strenuous activity.  There are no associated symptoms.  She does not meet guideline recommendation for any additional testing to evaluate this  2.  Patient has a carotid duplex and an echocardiogram ordered by neurology.  I asked her let us know when that is done so we can review those records.  Last neurology is to contact us as well if they see anything of concern.  Thank you very much for allowing us to participate in the care of this pleasant patient.  Please don't hesitate to call if I can be of assistance in any way.      Current Outpatient Medications:   •  aspirin 81 MG EC tablet, Take 81 mg by mouth 2 (two) times a day., Disp: , Rfl:   •  B Complex Vitamins (VITAMIN B COMPLEX) capsule capsule, Take 1 capsule by mouth Daily., Disp: , Rfl:   •  BIOTIN 5000 PO, Take 1 tablet by mouth Daily., Disp: , Rfl:   •  Canagliflozin (INVOKANA PO), TAKE 1 TABLET  (500 mg) EVERY MORNING BEFORE BREAKFAST, Disp: , Rfl:   •  Cholecalciferol (VITAMIN D3) 5000 units capsule capsule, Take 5,000 Units by mouth Daily., Disp: , Rfl:   •  Cyanocobalamin (B-12) 1000 MCG tablet controlled-release, Take 1 tablet by mouth Daily., Disp: , Rfl:   •  DULoxetine (CYMBALTA) 30 MG capsule, Take 30  mg by mouth Daily., Disp: , Rfl:   •  losartan (COZAAR) 50 MG tablet, TAKE 1 TABLET DAILY, Disp: , Rfl:   •  Magnesium Oxide 400 MG capsule, Take 1 tablet by mouth Daily., Disp: , Rfl:   •  metFORMIN (GLUCOPHAGE) 1000 MG tablet, 1,000 mg 2 (Two) Times a Day With Meals., Disp: , Rfl:   •  Omega-3 Fatty Acids (OMEGA 3 PO), Take 2 tablets by mouth Daily. 1500 mg daily, Disp: , Rfl:   •  pravastatin (PRAVACHOL) 40 MG tablet, Take 40 mg by mouth Every Night., Disp: , Rfl:   •  venlafaxine XR (EFFEXOR-XR) 75 MG 24 hr capsule, TAKE 1 CAPSULE DAILY, Disp: , Rfl:   •  vitamin C (ASCORBIC ACID) 500 MG tablet, Take 500 mg by mouth., Disp: , Rfl:          No follow-ups on file.

## 2021-12-17 ENCOUNTER — APPOINTMENT (OUTPATIENT)
Dept: WOMENS IMAGING | Facility: HOSPITAL | Age: 66
End: 2021-12-17

## 2021-12-17 PROCEDURE — 77063 BREAST TOMOSYNTHESIS BI: CPT | Performed by: RADIOLOGY

## 2021-12-17 PROCEDURE — 77067 SCR MAMMO BI INCL CAD: CPT | Performed by: RADIOLOGY
